# Patient Record
Sex: FEMALE | Race: OTHER | HISPANIC OR LATINO | URBAN - METROPOLITAN AREA
[De-identification: names, ages, dates, MRNs, and addresses within clinical notes are randomized per-mention and may not be internally consistent; named-entity substitution may affect disease eponyms.]

---

## 2024-02-05 ENCOUNTER — INPATIENT (INPATIENT)
Facility: HOSPITAL | Age: 39
LOS: 1 days | Discharge: HOME | DRG: 225 | End: 2024-02-07
Attending: SURGERY | Admitting: SURGERY
Payer: MEDICAID

## 2024-02-05 VITALS
HEART RATE: 84 BPM | TEMPERATURE: 98 F | RESPIRATION RATE: 18 BRPM | SYSTOLIC BLOOD PRESSURE: 119 MMHG | DIASTOLIC BLOOD PRESSURE: 72 MMHG | OXYGEN SATURATION: 100 %

## 2024-02-05 DIAGNOSIS — Z78.9 OTHER SPECIFIED HEALTH STATUS: ICD-10-CM

## 2024-02-05 LAB
ABO RH CONFIRMATION: SIGNIFICANT CHANGE UP
ALBUMIN SERPL ELPH-MCNC: 3.4 G/DL — SIGNIFICANT CHANGE UP (ref 3.3–5)
ALP SERPL-CCNC: 70 U/L — SIGNIFICANT CHANGE UP (ref 40–120)
ALT FLD-CCNC: 23 U/L — SIGNIFICANT CHANGE UP (ref 12–78)
ANION GAP SERPL CALC-SCNC: 3 MMOL/L — LOW (ref 5–17)
APPEARANCE UR: CLEAR — SIGNIFICANT CHANGE UP
APTT BLD: 29.4 SEC — SIGNIFICANT CHANGE UP (ref 24.5–35.6)
AST SERPL-CCNC: 10 U/L — LOW (ref 15–37)
BASOPHILS # BLD AUTO: 0.03 K/UL — SIGNIFICANT CHANGE UP (ref 0–0.2)
BASOPHILS NFR BLD AUTO: 0.4 % — SIGNIFICANT CHANGE UP (ref 0–2)
BILIRUB SERPL-MCNC: 0.3 MG/DL — SIGNIFICANT CHANGE UP (ref 0.2–1.2)
BILIRUB UR-MCNC: NEGATIVE — SIGNIFICANT CHANGE UP
BUN SERPL-MCNC: 10 MG/DL — SIGNIFICANT CHANGE UP (ref 7–23)
CALCIUM SERPL-MCNC: 8.6 MG/DL — SIGNIFICANT CHANGE UP (ref 8.5–10.1)
CHLORIDE SERPL-SCNC: 111 MMOL/L — HIGH (ref 96–108)
CO2 SERPL-SCNC: 26 MMOL/L — SIGNIFICANT CHANGE UP (ref 22–31)
COLOR SPEC: YELLOW — SIGNIFICANT CHANGE UP
CREAT SERPL-MCNC: 0.45 MG/DL — LOW (ref 0.5–1.3)
DIFF PNL FLD: NEGATIVE — SIGNIFICANT CHANGE UP
EGFR: 125 ML/MIN/1.73M2 — SIGNIFICANT CHANGE UP
EOSINOPHIL # BLD AUTO: 0.25 K/UL — SIGNIFICANT CHANGE UP (ref 0–0.5)
EOSINOPHIL NFR BLD AUTO: 3 % — SIGNIFICANT CHANGE UP (ref 0–6)
GLUCOSE SERPL-MCNC: 117 MG/DL — HIGH (ref 70–99)
GLUCOSE UR QL: NEGATIVE MG/DL — SIGNIFICANT CHANGE UP
HCT VFR BLD CALC: 35.5 % — SIGNIFICANT CHANGE UP (ref 34.5–45)
HGB BLD-MCNC: 11.3 G/DL — LOW (ref 11.5–15.5)
IMM GRANULOCYTES NFR BLD AUTO: 0.2 % — SIGNIFICANT CHANGE UP (ref 0–0.9)
INR BLD: 0.94 RATIO — SIGNIFICANT CHANGE UP (ref 0.85–1.18)
KETONES UR-MCNC: NEGATIVE MG/DL — SIGNIFICANT CHANGE UP
LEUKOCYTE ESTERASE UR-ACNC: NEGATIVE — SIGNIFICANT CHANGE UP
LYMPHOCYTES # BLD AUTO: 2.24 K/UL — SIGNIFICANT CHANGE UP (ref 1–3.3)
LYMPHOCYTES # BLD AUTO: 27.1 % — SIGNIFICANT CHANGE UP (ref 13–44)
MCHC RBC-ENTMCNC: 26 PG — LOW (ref 27–34)
MCHC RBC-ENTMCNC: 31.8 GM/DL — LOW (ref 32–36)
MCV RBC AUTO: 81.8 FL — SIGNIFICANT CHANGE UP (ref 80–100)
MONOCYTES # BLD AUTO: 0.64 K/UL — SIGNIFICANT CHANGE UP (ref 0–0.9)
MONOCYTES NFR BLD AUTO: 7.7 % — SIGNIFICANT CHANGE UP (ref 2–14)
NEUTROPHILS # BLD AUTO: 5.08 K/UL — SIGNIFICANT CHANGE UP (ref 1.8–7.4)
NEUTROPHILS NFR BLD AUTO: 61.6 % — SIGNIFICANT CHANGE UP (ref 43–77)
NITRITE UR-MCNC: NEGATIVE — SIGNIFICANT CHANGE UP
PH UR: 5.5 — SIGNIFICANT CHANGE UP (ref 5–8)
PLATELET # BLD AUTO: 281 K/UL — SIGNIFICANT CHANGE UP (ref 150–400)
POCT URINE PREGNANCY TEST: NEGATIVE — SIGNIFICANT CHANGE UP
POTASSIUM SERPL-MCNC: 3.5 MMOL/L — SIGNIFICANT CHANGE UP (ref 3.5–5.3)
POTASSIUM SERPL-SCNC: 3.5 MMOL/L — SIGNIFICANT CHANGE UP (ref 3.5–5.3)
PROT SERPL-MCNC: 7.3 GM/DL — SIGNIFICANT CHANGE UP (ref 6–8.3)
PROT UR-MCNC: NEGATIVE MG/DL — SIGNIFICANT CHANGE UP
PROTHROM AB SERPL-ACNC: 10.6 SEC — SIGNIFICANT CHANGE UP (ref 9.5–13)
RBC # BLD: 4.34 M/UL — SIGNIFICANT CHANGE UP (ref 3.8–5.2)
RBC # FLD: 14.8 % — HIGH (ref 10.3–14.5)
SODIUM SERPL-SCNC: 140 MMOL/L — SIGNIFICANT CHANGE UP (ref 135–145)
SP GR SPEC: 1.03 — SIGNIFICANT CHANGE UP (ref 1–1.03)
UROBILINOGEN FLD QL: 0.2 MG/DL — SIGNIFICANT CHANGE UP (ref 0.2–1)
WBC # BLD: 8.26 K/UL — SIGNIFICANT CHANGE UP (ref 3.8–10.5)
WBC # FLD AUTO: 8.26 K/UL — SIGNIFICANT CHANGE UP (ref 3.8–10.5)

## 2024-02-05 PROCEDURE — 85610 PROTHROMBIN TIME: CPT

## 2024-02-05 PROCEDURE — 93005 ELECTROCARDIOGRAM TRACING: CPT

## 2024-02-05 PROCEDURE — 74177 CT ABD & PELVIS W/CONTRAST: CPT | Mod: 26,MA

## 2024-02-05 PROCEDURE — 86900 BLOOD TYPING SEROLOGIC ABO: CPT

## 2024-02-05 PROCEDURE — 84100 ASSAY OF PHOSPHORUS: CPT

## 2024-02-05 PROCEDURE — 90471 IMMUNIZATION ADMIN: CPT

## 2024-02-05 PROCEDURE — 36415 COLL VENOUS BLD VENIPUNCTURE: CPT

## 2024-02-05 PROCEDURE — 81025 URINE PREGNANCY TEST: CPT

## 2024-02-05 PROCEDURE — C9290: CPT

## 2024-02-05 PROCEDURE — C1889: CPT

## 2024-02-05 PROCEDURE — 85730 THROMBOPLASTIN TIME PARTIAL: CPT

## 2024-02-05 PROCEDURE — 93010 ELECTROCARDIOGRAM REPORT: CPT

## 2024-02-05 PROCEDURE — 90686 IIV4 VACC NO PRSV 0.5 ML IM: CPT

## 2024-02-05 PROCEDURE — 86850 RBC ANTIBODY SCREEN: CPT

## 2024-02-05 PROCEDURE — 85027 COMPLETE CBC AUTOMATED: CPT

## 2024-02-05 PROCEDURE — 83735 ASSAY OF MAGNESIUM: CPT

## 2024-02-05 PROCEDURE — 76830 TRANSVAGINAL US NON-OB: CPT | Mod: 26

## 2024-02-05 PROCEDURE — 86901 BLOOD TYPING SEROLOGIC RH(D): CPT

## 2024-02-05 PROCEDURE — 88304 TISSUE EXAM BY PATHOLOGIST: CPT

## 2024-02-05 PROCEDURE — 80048 BASIC METABOLIC PNL TOTAL CA: CPT

## 2024-02-05 PROCEDURE — 99285 EMERGENCY DEPT VISIT HI MDM: CPT

## 2024-02-05 RX ORDER — ACETAMINOPHEN 500 MG
1000 TABLET ORAL ONCE
Refills: 0 | Status: DISCONTINUED | OUTPATIENT
Start: 2024-02-05 | End: 2024-02-07

## 2024-02-05 RX ORDER — INFLUENZA VIRUS VACCINE 15; 15; 15; 15 UG/.5ML; UG/.5ML; UG/.5ML; UG/.5ML
0.5 SUSPENSION INTRAMUSCULAR ONCE
Refills: 0 | Status: COMPLETED | OUTPATIENT
Start: 2024-02-05 | End: 2024-02-07

## 2024-02-05 RX ORDER — KETOROLAC TROMETHAMINE 30 MG/ML
15 SYRINGE (ML) INJECTION EVERY 8 HOURS
Refills: 0 | Status: DISCONTINUED | OUTPATIENT
Start: 2024-02-05 | End: 2024-02-07

## 2024-02-05 RX ORDER — CEFTRIAXONE 500 MG/1
1000 INJECTION, POWDER, FOR SOLUTION INTRAMUSCULAR; INTRAVENOUS EVERY 24 HOURS
Refills: 0 | Status: DISCONTINUED | OUTPATIENT
Start: 2024-02-05 | End: 2024-02-05

## 2024-02-05 RX ORDER — ACETAMINOPHEN 500 MG
1000 TABLET ORAL ONCE
Refills: 0 | Status: COMPLETED | OUTPATIENT
Start: 2024-02-05 | End: 2024-02-05

## 2024-02-05 RX ORDER — SODIUM CHLORIDE 9 MG/ML
1000 INJECTION INTRAMUSCULAR; INTRAVENOUS; SUBCUTANEOUS ONCE
Refills: 0 | Status: COMPLETED | OUTPATIENT
Start: 2024-02-05 | End: 2024-02-05

## 2024-02-05 RX ORDER — METRONIDAZOLE 500 MG
500 TABLET ORAL EVERY 8 HOURS
Refills: 0 | Status: DISCONTINUED | OUTPATIENT
Start: 2024-02-05 | End: 2024-02-06

## 2024-02-05 RX ORDER — CEFTRIAXONE 500 MG/1
1000 INJECTION, POWDER, FOR SOLUTION INTRAMUSCULAR; INTRAVENOUS EVERY 24 HOURS
Refills: 0 | Status: DISCONTINUED | OUTPATIENT
Start: 2024-02-05 | End: 2024-02-06

## 2024-02-05 RX ORDER — MORPHINE SULFATE 50 MG/1
4 CAPSULE, EXTENDED RELEASE ORAL ONCE
Refills: 0 | Status: DISCONTINUED | OUTPATIENT
Start: 2024-02-05 | End: 2024-02-05

## 2024-02-05 RX ORDER — SODIUM CHLORIDE 9 MG/ML
1000 INJECTION, SOLUTION INTRAVENOUS
Refills: 0 | Status: DISCONTINUED | OUTPATIENT
Start: 2024-02-05 | End: 2024-02-06

## 2024-02-05 RX ADMIN — Medication 400 MILLIGRAM(S): at 19:35

## 2024-02-05 RX ADMIN — MORPHINE SULFATE 4 MILLIGRAM(S): 50 CAPSULE, EXTENDED RELEASE ORAL at 15:45

## 2024-02-05 RX ADMIN — SODIUM CHLORIDE 1000 MILLILITER(S): 9 INJECTION INTRAMUSCULAR; INTRAVENOUS; SUBCUTANEOUS at 15:45

## 2024-02-05 RX ADMIN — Medication 100 MILLIGRAM(S): at 21:44

## 2024-02-05 RX ADMIN — CEFTRIAXONE 1000 MILLIGRAM(S): 500 INJECTION, POWDER, FOR SOLUTION INTRAMUSCULAR; INTRAVENOUS at 19:35

## 2024-02-05 RX ADMIN — SODIUM CHLORIDE 115 MILLILITER(S): 9 INJECTION, SOLUTION INTRAVENOUS at 21:15

## 2024-02-05 NOTE — ED STATDOCS - ATTENDING APP SHARED VISIT CONTRIBUTION OF CARE
I, Hanna Jacques MD, performed the initial face to face bedside interview with this patient regarding history of present illness, review of symptoms and relevant past medical, social and family history.  I completed an independent physical examination.  I was the initial provider who evaluated this patient. I have signed out the follow up of any pending tests (i.e. labs, radiological studies) to the ACP.  I have communicated the patient’s plan of care and disposition with the ACP.  The history, relevant review of systems, past medical and surgical history, medical decision making, and physical examination was documented by the scribe in my presence and I attest to the accuracy of the documentation.

## 2024-02-05 NOTE — ED STATDOCS - PROGRESS NOTE DETAILS
37 y/o Female presents to ED c/o lower abdominal pain with foul smelling urine.  Will F/u Labs , CT and pelvic US imaging.  Will reassess s/p pain meds.  Anisa Teixeira PA-C WBC 8 CT scan with evidence of possible early appendicitis there is a dilated fluid-filled appendix on reexamination patient does have right lower quadrant tenderness with rebound and guarding called surgical resident for Dr. Del Cid to discuss case came to consult on patient and will be admitting to Dr. Del Cid service for appendectomy patient still uncomfortable so added BRADLY Shirley of for further pain control made patient aware she is n.p.o..  Anisa Teixeira PA-C

## 2024-02-05 NOTE — H&P ADULT - ASSESSMENT
Plan:  - Admit under Dr. Vivas 's service  - WIll undergo lap appy today  - Pain control PRN  - Monitor vitals  - NPO   - Nausea control PRN  - IV abx: Rocephin, flagyl  - IVF: LR@   - replete electrolytes  - daily labs  - OOB/PT      Plan will be discussed with General & Bariatric surgery attending, Dr. Vivas

## 2024-02-05 NOTE — H&P ADULT - NSHPLABSRESULTS_GEN_ALL_CORE
Chief complaint:      PMHx:  No pertinent past medical history        PSHx:      FHx:      Vitals:  T(C): 36.7 (02-05 @ 13:33), Max: 36.7 (02-05 @ 13:33)  HR: 84 (02-05 @ 13:33) (84 - 84)  BP: 119/72 (02-05 @ 13:33) (119/72 - 119/72)  RR: 18 (02-05 @ 13:33) (18 - 18)  SpO2: 100% (02-05 @ 13:33) (100% - 100%)      I&Os    .    Labs:  02-05 @ 14:47                    11.3  CBC: 8.26>)-------(<281                     35.5                 140 | 111 | 10    CMP:  ----------------------< 117               3.5 | 26 | 0.45                      Ca:8.6  Phos:-  Mg:-               0.3|      |10        LFTs:  ------|70|-----             -|      |-        Cultures:        Current Inpatient Medications:

## 2024-02-05 NOTE — ED STATDOCS - OBJECTIVE STATEMENT
39 y/o female presents to the ED c/o lower abd pain x3 days. Pt has been taking Tylenol at home without relief. Pt reports foul smelling urine. LNMP: 1/20. NKDA. No other complaints at this time.

## 2024-02-05 NOTE — ED ADULT NURSE NOTE - OBJECTIVE STATEMENT
Pt comes to the ED complaining of lower abdominal pain, burning urination and foul smelling urine x 3 days.

## 2024-02-05 NOTE — ED ADULT TRIAGE NOTE - HEIGHT IN INCHES
Pre-operative Bariatric Nutrition Evaluation ()     Date: 2021   Paul Castrejon M.D. Name: Henry Osorio  :  1988  Age:  34  Gender: Female   Type of Surgery: []           Gastric Bypass   [x]           Sleeve Gastrectomy    ASSESSMENT:     Medications/Supplements:   Prior to Admission medications    Medication Sig Start Date End Date Taking? Authorizing Provider   ibuprofen (MOTRIN) 600 mg tablet Take 1 Tab by mouth every six (6) hours as needed for Pain. 2/3/21   Tyra Bailey NP   aspirin (ASPIRIN) 325 mg tablet Take 325 mg by mouth daily. Provider, Historical   INTRAUTERINE DEVICE, IUD, IU by IntraUTERine route. Provider, Historical   butalbital-acetaminophen-caffeine (FIORICET, ESGIC) -40 mg per tablet Take 1 Tab by mouth every four (4) hours as needed for Headache. 20   Brianna ELLSWORTH NP   cetirizine (ZYRTEC) 10 mg tablet Take 1 Tab by mouth daily. 20   Zahra Banks MD   albuterol (PROVENTIL HFA, VENTOLIN HFA, PROAIR HFA) 90 mcg/actuation inhaler Take 2 Puffs by inhalation every six (6) hours as needed for Wheezing. 20   Zahra Banks MD       Food Allergies/Intolerances:none    Anthropometrics:    Ht:64\"   Recent Office Wt: 304    IBW: 120#    %IBW: 253%     BMI:52    Category: obesity III     Reported wt history: Pt completing pre-op nutrition evaluation for wt loss surgery over the phone d/t social distancing guidelines d/t COVID-19. Reports lowest adult BW of 170# and highest adult BW of 304#. Attributes wt gain over the years r/t wt gain with pregnancies . Has attempted wt loss through various methods with most successful wt loss of 90#. Has been unable to maintain long term or significant wt loss and is now seeking approval for weight loss surgery. Pt will need to complete 6 months of supervised weight loss for insurance requirements.      Exercise/Physical Activity:stationarby bike 20-30 minutes, 2-3 times per week    Reported Diet History:self-directed diets, portion control, exercise     24 Hour Diet Recall  Breakfast  Coffee, turkey sandwich    Lunch  Skips or leftovers from dinner    Dinner  Chicken or fish sandwich,    Snacks  Candy bar and chips, mashed potatoes, mac and cheese    Beverages  Water, Juice, no soda,       Environment/Psychosocial/Support:Pt reports good support from mother. Pt works as an online /garcia. Pt has 2 children. Pt has two close friends who have had weight loss surgery. NUTRITION DIAGNOSIS:  1. Excessive energy intake r/t food and beverage choices evidenced by diet recall that reveals high caloric density foods and beverages. 2. Food and nutrition related knowledge deficit r/t lack of prior exposure to information evidenced by pt seeking nutrition education for sleeve gastrectomy. NUTRITION INTERVENTION:  Pt educated on nutrition recommendations for weight loss surgery, specifically sleeve gastrectomy. Instructed on consuming 3 meals per day starting now. Use the balanced plate method to plan meals, include 3 oz of lean source of protein, 1/2 cup whole grains, unlimited non-starchy vegetables, 1/2 cup fruit and 1 serving of low fat dairy. Utilize handouts listing healthy snack and meal ideas to limit restaurant meals. After surgery measure all meals to 1/2 cup. Each meal will contain a 1/4 cup lean protein and 1/4 cup fruit, non-starchy vegetable or starch (limiting to once per day). Aim for 60 g protein per day. Sip on 48-64 oz of sugar free, calorie free, non-carbonated beverages each day. Do not use a straw. Do not consume beverages 30 minutes before, during or 30 minutes after meals. Read all nutrition labels. Demonstrated and emphasized identifying serving size, total fat, sugar and protein content. Defined low fat as </= 3 g per serving. Discussed lean and extra lean sources of protein. Provided list of low fat cooking methods.  Avoid foods with sugar listed in the first 3 ingredients and >/15 g sugar per serving. Excess sugar/fat intake may lead to dumping syndrome. Discussed signs and symptoms of dumping syndrome. Practice mindful eating habits; take small bites, chew thoroughly, avoid distractions, utilize hunger/fullness scale. Consume meals over 20-30 minutes. Attend Bariatric Support Group and increase physical activity (approved per MD) for long term weight maintenance. NUTRITION MONITORING AND EVALUATION:    The following goals were established with patient;  1. Improve food and beverage choices. Focus on more lean protein, fruits, vegetables, whole grains and low-fat options. 2. Limit juice intake to only 4 oz per day. Drink more water and other calorie-free and sugar-free fluids. 3. Maintain current exercise and aim for 150 minutes per week as tolerated. 4. Review nutrition education materials provided. Follow up next month for continued nutrition education and supervised weight loss. Specific tips and techniques to facilitate compliance with above recommendations were provided and discussed. Nutrition evaluation reveals pt is actively making small changes with continued changes indicated. Goals set and recommendations made. Will continue to assess. If further details are desired please feel free to contact me at [phone]. This phone number was also provided to the patient for any further questions or concerns.            Waqas Reynolds RD 10

## 2024-02-05 NOTE — ED ADULT TRIAGE NOTE - CHIEF COMPLAINT QUOTE
pt c/o lower abdominal pain, nausea, vomiting x 3 days . lmp 01/20/2023. pain 10/10. denies cp/sob/n/v/d/fever/chills. no signigicant pmh.

## 2024-02-05 NOTE — H&P ADULT - NSHPPHYSICALEXAM_GEN_ALL_CORE
ROS: Negative except written above    PHYSICAL EXAM:  - GENERAL: No acute distress.  - EYES: EOMI. Anicteric.  - HENT: Moist mucous membranes. No scleral icterus. No cervical lymphadenopathy.  - LUNGS:  No accessory muscle use.  - CARDIOVASCULAR: Regular rate and rhythm.   - ABDOMEN: obese, Soft, mildly tender to palpation at RLQ and non-distended. No palpable masses.  - EXTREMITIES: No edema. Non-tender.  - SKIN: No rashes or lesions. Warm.  - NEUROLOGIC: No focal neurological deficits. CN II-XII grossly intact, but not individually tested.  - PSYCHIATRIC: Cooperative. Appropriate mood and affect.

## 2024-02-05 NOTE — H&P ADULT - NSHPPOAPULMEMBOLUS_GEN_A_CORE
Received the following message from the [facility] team:    Yamilet Gallegos,   Patient has straight Medicare/Medicaid and prior authorization is not required for either plan. Glad the system auto authorized in this instance! Patient is good to go. Thanks for confirming. Order faxed to [facility] and patient notified. no

## 2024-02-05 NOTE — H&P ADULT - HISTORY OF PRESENT ILLNESS
38F w/ no PMHx nor PSHx presented with 3 days of lower abdominal pain that has not been subsided with tylenol. Denies fever, admits chills. + Nausea, no Vomiting. Denies similar pain in the past. + diarrhea yesterday. Denies other complaints suchs as CP, SOB at this time    PMHx: Denies  PSHx: Denies  ALL: NKDA    Yi speaking

## 2024-02-05 NOTE — PHARMACOTHERAPY INTERVENTION NOTE - COMMENTS
Medication reconciliation completed.  Reviewed Medication list and confirmed med allergies with patient; confirmed with Dr. Longoria MedHx.  pt confirms that she does not take any medication at home.

## 2024-02-05 NOTE — ED STATDOCS - PHYSICAL EXAMINATION
Constitutional: NAD   Eyes: PERRLA  Head: Normocephalic   Mouth: MMM  Cardiac: regular rate   Resp: Lungs CTAB  GI: Abd s/nd, no rebound or guarding. b/l lower quadrant tenderness. No CVAT  Neuro: awake, alert, moving all extremities  Skin: No rashes

## 2024-02-06 LAB
ANION GAP SERPL CALC-SCNC: 5 MMOL/L — SIGNIFICANT CHANGE UP (ref 5–17)
BUN SERPL-MCNC: 8 MG/DL — SIGNIFICANT CHANGE UP (ref 7–23)
CALCIUM SERPL-MCNC: 8 MG/DL — LOW (ref 8.5–10.1)
CHLORIDE SERPL-SCNC: 113 MMOL/L — HIGH (ref 96–108)
CO2 SERPL-SCNC: 23 MMOL/L — SIGNIFICANT CHANGE UP (ref 22–31)
CREAT SERPL-MCNC: 0.47 MG/DL — LOW (ref 0.5–1.3)
EGFR: 124 ML/MIN/1.73M2 — SIGNIFICANT CHANGE UP
GLUCOSE SERPL-MCNC: 91 MG/DL — SIGNIFICANT CHANGE UP (ref 70–99)
HCG UR QL: NEGATIVE — SIGNIFICANT CHANGE UP
HCT VFR BLD CALC: 31.7 % — LOW (ref 34.5–45)
HGB BLD-MCNC: 10.1 G/DL — LOW (ref 11.5–15.5)
MAGNESIUM SERPL-MCNC: 2.1 MG/DL — SIGNIFICANT CHANGE UP (ref 1.6–2.6)
MCHC RBC-ENTMCNC: 25.8 PG — LOW (ref 27–34)
MCHC RBC-ENTMCNC: 31.9 GM/DL — LOW (ref 32–36)
MCV RBC AUTO: 80.9 FL — SIGNIFICANT CHANGE UP (ref 80–100)
PHOSPHATE SERPL-MCNC: 3.1 MG/DL — SIGNIFICANT CHANGE UP (ref 2.5–4.5)
PLATELET # BLD AUTO: 243 K/UL — SIGNIFICANT CHANGE UP (ref 150–400)
POTASSIUM SERPL-MCNC: 3.4 MMOL/L — LOW (ref 3.5–5.3)
POTASSIUM SERPL-SCNC: 3.4 MMOL/L — LOW (ref 3.5–5.3)
RBC # BLD: 3.92 M/UL — SIGNIFICANT CHANGE UP (ref 3.8–5.2)
RBC # FLD: 14.9 % — HIGH (ref 10.3–14.5)
SODIUM SERPL-SCNC: 141 MMOL/L — SIGNIFICANT CHANGE UP (ref 135–145)
WBC # BLD: 5.69 K/UL — SIGNIFICANT CHANGE UP (ref 3.8–10.5)
WBC # FLD AUTO: 5.69 K/UL — SIGNIFICANT CHANGE UP (ref 3.8–10.5)

## 2024-02-06 PROCEDURE — 88304 TISSUE EXAM BY PATHOLOGIST: CPT | Mod: 26

## 2024-02-06 RX ORDER — HYDROMORPHONE HYDROCHLORIDE 2 MG/ML
1 INJECTION INTRAMUSCULAR; INTRAVENOUS; SUBCUTANEOUS
Refills: 0 | Status: DISCONTINUED | OUTPATIENT
Start: 2024-02-06 | End: 2024-02-06

## 2024-02-06 RX ORDER — POTASSIUM CHLORIDE 20 MEQ
10 PACKET (EA) ORAL
Refills: 0 | Status: COMPLETED | OUTPATIENT
Start: 2024-02-06 | End: 2024-02-06

## 2024-02-06 RX ORDER — OXYCODONE AND ACETAMINOPHEN 5; 325 MG/1; MG/1
1 TABLET ORAL
Qty: 20 | Refills: 0
Start: 2024-02-06 | End: 2024-02-10

## 2024-02-06 RX ORDER — ONDANSETRON 8 MG/1
4 TABLET, FILM COATED ORAL ONCE
Refills: 0 | Status: DISCONTINUED | OUTPATIENT
Start: 2024-02-06 | End: 2024-02-06

## 2024-02-06 RX ORDER — SODIUM CHLORIDE 9 MG/ML
1000 INJECTION, SOLUTION INTRAVENOUS
Refills: 0 | Status: DISCONTINUED | OUTPATIENT
Start: 2024-02-06 | End: 2024-02-06

## 2024-02-06 RX ADMIN — SODIUM CHLORIDE 115 MILLILITER(S): 9 INJECTION, SOLUTION INTRAVENOUS at 05:05

## 2024-02-06 RX ADMIN — Medication 15 MILLIGRAM(S): at 20:02

## 2024-02-06 RX ADMIN — SODIUM CHLORIDE 115 MILLILITER(S): 9 INJECTION, SOLUTION INTRAVENOUS at 17:50

## 2024-02-06 RX ADMIN — Medication 100 MILLIGRAM(S): at 05:23

## 2024-02-06 RX ADMIN — Medication 100 MILLIEQUIVALENT(S): at 10:16

## 2024-02-06 RX ADMIN — Medication 100 MILLIEQUIVALENT(S): at 09:11

## 2024-02-06 RX ADMIN — Medication 15 MILLIGRAM(S): at 20:30

## 2024-02-06 RX ADMIN — Medication 100 MILLIEQUIVALENT(S): at 08:15

## 2024-02-06 NOTE — DISCHARGE NOTE PROVIDER - HOSPITAL COURSE
38F w/ no PMHx nor PSHx presented with 3 days of lower abdominal pain that has not been subsided with tylenol. Imaging suspicious for acute appendicitis. Patient taken to OR for laparoscopic appendectomy. Patient tolerated the procedure well and diet was advanced as tolerated. Patient discharged once tolerating diet with normal bowel function and pain well controlled.

## 2024-02-06 NOTE — DISCHARGE NOTE PROVIDER - NSDCMRMEDTOKEN_GEN_ALL_CORE_FT
Percocet 5 mg-325 mg oral tablet: 1 tab(s) orally every 6 hours as needed for  moderate pain MDD: 4gm

## 2024-02-06 NOTE — DIETITIAN INITIAL EVALUATION ADULT - ADD RECOMMEND
1. Recommend ADAT to Regular diet as medically feasible  2. Encourage protein-rich foods, maximize food preferences   3. Monitor bowel movements, if no BM for >3 days, consider implementing bowel regimen.   4. MVI w/ minerals daily to ensure 100% RDA met   5. Obtain vitamin D 25OH level to assess nutriture   6. Please obtain weekly weights   7. Confirm goals of care regarding nutrition support   RD will continue to monitor PO intake, labs, hydration, and wt prn.

## 2024-02-06 NOTE — DISCHARGE NOTE PROVIDER - NSDCCPTREATMENT_GEN_ALL_CORE_FT
PRINCIPAL PROCEDURE  Procedure: Laparoscopic appendectomy  Findings and Treatment:       SECONDARY PROCEDURE  Procedure: Block, transversus abdominis plane, bilateral  Findings and Treatment:

## 2024-02-06 NOTE — DIETITIAN INITIAL EVALUATION ADULT - NS FNS DIET ORDER
Diet, NPO (02-05-24 @ 17:59)  Diet, NPO:   NPO for Procedure/Test     NPO Start Date: 05-Feb-2024,   NPO Start Time: 17:00 (02-05-24 @ 17:52)

## 2024-02-06 NOTE — PROGRESS NOTE ADULT - ATTENDING COMMENTS
Patient resting in bed, afebrile, stable, with improvement of her abdominal pain. Discussed in laymen's terms appendicitis & risk & benefit's of surgery.

## 2024-02-06 NOTE — DIETITIAN INITIAL EVALUATION ADULT - PERTINENT MEDS FT
MEDICATIONS  (STANDING):  cefTRIAXone Injectable. 1000 milliGRAM(s) IV Push every 24 hours  influenza   Vaccine 0.5 milliLiter(s) IntraMuscular once  lactated ringers. 1000 milliLiter(s) (115 mL/Hr) IV Continuous <Continuous>  lactated ringers. 1000 milliLiter(s) (75 mL/Hr) IV Continuous <Continuous>  metroNIDAZOLE  IVPB 500 milliGRAM(s) IV Intermittent every 8 hours    MEDICATIONS  (PRN):  acetaminophen   IVPB .. 1000 milliGRAM(s) IV Intermittent once PRN Temp greater or equal to 38C (100.4F), Mild Pain (1 - 3)  HYDROmorphone  Injectable 1 milliGRAM(s) IV Push every 10 minutes PRN Severe Pain (7 - 10)  ketorolac   Injectable 15 milliGRAM(s) IV Push every 8 hours PRN Severe Pain (7 - 10)  ondansetron Injectable 4 milliGRAM(s) IV Push once PRN Nausea and/or Vomiting

## 2024-02-06 NOTE — PRE-OP CHECKLIST - SELECT TESTS ORDERED
BMP/CBC/CMP/PT/PTT/INR/Type and Screen/Urinalysis/EKG BMP/CBC/CMP/PT/PTT/INR/Type and Screen/Urinalysis/UCG/EKG

## 2024-02-06 NOTE — DIETITIAN INITIAL EVALUATION ADULT - ORAL INTAKE PTA/DIET HISTORY
Pt is Greek speaking. Unable to reach Language Line , spoke to cousin at bed side. Reports pt usually has good appetite, consumes 3 meals/day however has had 2 days poor appeite 2/2 nausea, pain. Pt lives w/ cousin who grocery shops and cooks for pt.

## 2024-02-06 NOTE — DIETITIAN INITIAL EVALUATION ADULT - OTHER INFO
38F w/ no PMHx nor PSHx presented with 3 days of lower abdominal pain that has not been subsided with tylenol. + Nausea, no Vomiting. Denies similar pain in the past. + diarrhea yesterday. Adm w/ appendicitis. Plan for OR today for appendectomy.     Pt reports appetite is improved and she is hungry at time of RD visit, however pt currently NPO for appy 2/6. Endorses UBW of 180# from unknown time frame. Bed scale wt of 179.4# obtained by RD on 2/6. NFPE reveals no muscle/fat wasting at this time - pt appears slightly overwt. Recommend ADAT to Regular diet as medically feasible. See below for further recommendations.

## 2024-02-06 NOTE — DIETITIAN INITIAL EVALUATION ADULT - PERTINENT LABORATORY DATA
02-06    141  |  113<H>  |  8   ----------------------------<  91  3.4<L>   |  23  |  0.47<L>    Ca    8.0<L>      06 Feb 2024 05:12  Phos  3.1     02-06  Mg     2.1     02-06    TPro  7.3  /  Alb  3.4  /  TBili  0.3  /  DBili  x   /  AST  10<L>  /  ALT  23  /  AlkPhos  70  02-05

## 2024-02-06 NOTE — BRIEF OPERATIVE NOTE - NSICDXBRIEFPROCEDURE_GEN_ALL_CORE_FT
PROCEDURES:  Laparoscopic appendectomy 06-Feb-2024 14:13:34  Rafael Walter, transversus abdominis plane, bilateral 06-Feb-2024 14:14:08  Rafael Walter

## 2024-02-06 NOTE — DISCHARGE NOTE PROVIDER - CARE PROVIDER_API CALL
Juan José Vivas  Surgery  [address] [place] [address], [address]  [place] [address]  Phone: [phone]  Fax: [phone]  Follow Up Time: 2 weeks

## 2024-02-06 NOTE — DIETITIAN INITIAL EVALUATION ADULT - NSFNSGIIOFT_GEN_A_CORE
I&O's Detail    05 Feb 2024 07:01  -  06 Feb 2024 07:00  --------------------------------------------------------  IN:    IV PiggyBack: 100 mL    Lactated Ringers: 1265 mL  Total IN: 1365 mL    OUT:  Total OUT: 0 mL    Total NET: 1365 mL      06 Feb 2024 07:01  -  06 Feb 2024 12:46  --------------------------------------------------------  IN:    IV PiggyBack: 300 mL    Lactated Ringers: 575 mL  Total IN: 875 mL    OUT:  Total OUT: 0 mL    Total NET: 875 mL

## 2024-02-07 VITALS
RESPIRATION RATE: 16 BRPM | OXYGEN SATURATION: 100 % | SYSTOLIC BLOOD PRESSURE: 106 MMHG | DIASTOLIC BLOOD PRESSURE: 65 MMHG | HEART RATE: 89 BPM | TEMPERATURE: 99 F

## 2024-02-07 RX ADMIN — Medication 15 MILLIGRAM(S): at 04:20

## 2024-02-07 RX ADMIN — Medication 15 MILLIGRAM(S): at 05:00

## 2024-02-07 RX ADMIN — INFLUENZA VIRUS VACCINE 0.5 MILLILITER(S): 15; 15; 15; 15 SUSPENSION INTRAMUSCULAR at 09:50

## 2024-02-07 NOTE — PROGRESS NOTE ADULT - ASSESSMENT
38F w/ no PMHx nor PSHx, admitted for acute appendicitis POD1 Lap appy      Plan:  - DC today        
38F w/ no PMHx nor PSHx, admitted for acute appendicitis      Plan:  - OR: lap appy today  - Pain control PRN  - NPO , m fluids   - Nausea control PRN  - continue w ABx till OR  - OOB

## 2024-02-07 NOTE — DISCHARGE NOTE NURSING/CASE MANAGEMENT/SOCIAL WORK - PATIENT PORTAL LINK FT
You can access the FollowMyHealth Patient Portal offered by [facility] by registering at the following website: [email or web address]. By joining [facility]’s FollowMyHealth portal, you will also be able to view your health information using other applications (apps) compatible with our system.

## 2024-02-07 NOTE — PROGRESS NOTE ADULT - SUBJECTIVE AND OBJECTIVE BOX
doing well this am, POD1 appendectomy, pain controlled , ready to be discharged, wanted to stay overnight because of ride issue     PHYSICAL EXAM:  - GENERAL: No acute distress.  - EYES: EOMI. Anicteric.  - HENT: Moist mucous membranes. No scleral icterus. No cervical lymphadenopathy.  - LUNGS:  No accessory muscle use.  - CARDIOVASCULAR: Regular rate and rhythm.   - ABDOMEN: obese, Soft, appropriately tender, incisions c/d/i   - EXTREMITIES: No edema. Non-tender.          Vitals:  T(C): 36.8 ( @ 04:08), Max: 37.5 ( @ 08:19)  HR: 76 ( @ :08) (66 - 86)  BP: 101/66 ( @ 04:08) (101/51 - 115/65)  RR: 16 ( @ 04:08) (14 - 19)  SpO2: 100% ( 04:08) (97% - 100%)      I&Os     @ 07:01  -   @ 07:00  --------------------------------------------------------  IN:    IV PiggyBack: 100 mL    Lactated Ringers: 1265 mL  Total IN: 1365 mL    OUT:  Total OUT: 0 mL    Total NET: 1365 mL       @ 07:01  -   @ 05:54  --------------------------------------------------------  IN:    IV PiggyBack: 300 mL    Lactated Ringers: 1322.5 mL    Other (mL): 500 mL  Total IN: 2122.5 mL    OUT:    Voided (mL): 200 mL  Total OUT: 200 mL    Total NET: 1922.5 mL        .    Labs:   @ 05:12                    10.1  CBC: 5.69>)-------(<243                     31.7                 141 | 113 | 8    CMP:  ----------------------< 91               3.4 | 23 | 0.47                      Ca:8.0  Phos:3.1  M.1               -|      |-        LFTs:  ------|-|-----             -|      |-        Cultures:    Culture - Urine (collected 24 @ 14:47)  Source: Clean Catch Clean Catch (Midstream)  Preliminary Report (24 @ 23:02):    >100,000 CFU/ml Klebsiella pneumoniae          Current Inpatient Medications:  acetaminophen   IVPB .. 1000 milliGRAM(s) IV Intermittent once PRN  influenza   Vaccine 0.5 milliLiter(s) IntraMuscular once  ketorolac   Injectable 15 milliGRAM(s) IV Push every 8 hours PRN      
doing well this am, admitted for appendicitis, planning OR today, paim improved, NPO, m fluids, on IV ABx, no fever, no N/V    PHYSICAL EXAM:  - GENERAL: No acute distress.  - EYES: EOMI. Anicteric.  - HENT: Moist mucous membranes. No scleral icterus. No cervical lymphadenopathy.  - LUNGS:  No accessory muscle use.  - CARDIOVASCULAR: Regular rate and rhythm.   - ABDOMEN: obese, Soft, mildly tender to palpation at RLQ and non-distended. No palpable masses.  - EXTREMITIES: No edema. Non-tender.        Vitals:  T(C): 36.7 ( @ 04:04), Max: 36.8 ( @ 20:15)  HR: 72 ( @ 04:04) (63 - 85)  BP: 109/58 ( @ 04:04) (100/58 - 119/72)  RR: 18 ( @ 04:04) (17 - 18)  SpO2: 96% ( @ 04:04) (96% - 100%)      I&Os     @ 07:01  -   @ 06:42  --------------------------------------------------------  IN:    IV PiggyBack: 100 mL    Lactated Ringers: 1265 mL  Total IN: 1365 mL    OUT:  Total OUT: 0 mL    Total NET: 1365 mL        .    Labs:   @ 05:12                    10.1  CBC: 5.69>)-------(<243                     31.7                 141 | 113 | 8    CMP:  ----------------------< 91               3.4 | 23 | 0.47                      Ca:8.0  Phos:3.1  M.1               -|      |-        LFTs:  ------|-|-----             -|      |-   @ 14:47                    11.3  CBC: 8.26>)-------(<281                     35.5                 140 | 111 | 10    CMP:  ----------------------< 117               3.5 | 26 | 0.45                      Ca:8.6  Phos:-  Mg:-               0.3|      |10        LFTs:  ------|70|-----             -|      |-        Cultures:        Current Inpatient Medications:  acetaminophen   IVPB .. 1000 milliGRAM(s) IV Intermittent once PRN  cefTRIAXone Injectable. 1000 milliGRAM(s) IV Push every 24 hours  influenza   Vaccine 0.5 milliLiter(s) IntraMuscular once  ketorolac   Injectable 15 milliGRAM(s) IV Push every 8 hours PRN  lactated ringers. 1000 milliLiter(s) (115 mL/Hr) IV Continuous <Continuous>  metroNIDAZOLE  IVPB 500 milliGRAM(s) IV Intermittent every 8 hours  potassium chloride  10 mEq/100 mL IVPB 10 milliEquivalent(s) IV Intermittent every 1 hour

## 2024-02-07 NOTE — DISCHARGE NOTE NURSING/CASE MANAGEMENT/SOCIAL WORK - NSDCVIVACCINE_GEN_ALL_CORE_FT
influenza, injectable, quadrivalent, preservative free; 07-Feb-2024 09:50; Anastasiia Wooten (RN); [employer]; [id number] (Exp. Date: 01-Jun-2024); IntraMuscular; Deltoid Left.; 0.5 milliLiter(s); VIS (VIS Published: 06-Aug-2021, VIS Presented: 07-Feb-2024);

## 2024-02-13 DIAGNOSIS — K35.80 UNSPECIFIED ACUTE APPENDICITIS: ICD-10-CM

## 2024-02-14 LAB — SURGICAL PATHOLOGY STUDY: SIGNIFICANT CHANGE UP

## 2024-07-23 NOTE — PATIENT PROFILE ADULT - DOES PATIENT HAVE ADVANCE DIRECTIVE
No protocol for requested medication.      Last office visit date: 06/19/2024  Pharmacy: [facility] #[id number] [address]    Order pended, routed to clinician for review.      No

## 2025-01-08 ENCOUNTER — EMERGENCY (EMERGENCY)
Facility: HOSPITAL | Age: 40
LOS: 0 days | Discharge: HOME | End: 2025-01-08
Attending: STUDENT IN AN ORGANIZED HEALTH CARE EDUCATION/TRAINING PROGRAM
Payer: MEDICAID

## 2025-01-08 VITALS
HEART RATE: 86 BPM | SYSTOLIC BLOOD PRESSURE: 102 MMHG | OXYGEN SATURATION: 100 % | DIASTOLIC BLOOD PRESSURE: 61 MMHG | RESPIRATION RATE: 17 BRPM | TEMPERATURE: 98 F

## 2025-01-08 VITALS — WEIGHT: 165.35 LBS | HEIGHT: 60 IN

## 2025-01-08 DIAGNOSIS — J10.00 INFLUENZA DUE TO OTHER IDENTIFIED INFLUENZA VIRUS WITH UNSPECIFIED TYPE OF PNEUMONIA: ICD-10-CM

## 2025-01-08 DIAGNOSIS — R05.9 COUGH, UNSPECIFIED: ICD-10-CM

## 2025-01-08 PROBLEM — Z78.9 OTHER SPECIFIED HEALTH STATUS: Chronic | Status: ACTIVE | Noted: 2024-02-05

## 2025-01-08 LAB
ALBUMIN SERPL ELPH-MCNC: 3.2 G/DL — LOW (ref 3.3–5)
ALP SERPL-CCNC: 66 U/L — SIGNIFICANT CHANGE UP (ref 40–120)
ALT FLD-CCNC: 26 U/L — SIGNIFICANT CHANGE UP (ref 12–78)
ANION GAP SERPL CALC-SCNC: 4 MMOL/L — LOW (ref 5–17)
ANISOCYTOSIS BLD QL: SLIGHT — SIGNIFICANT CHANGE UP
AST SERPL-CCNC: 18 U/L — SIGNIFICANT CHANGE UP (ref 15–37)
BASOPHILS # BLD AUTO: 0.09 K/UL — SIGNIFICANT CHANGE UP (ref 0–0.2)
BASOPHILS NFR BLD AUTO: 0.6 % — SIGNIFICANT CHANGE UP (ref 0–2)
BILIRUB SERPL-MCNC: 0.4 MG/DL — SIGNIFICANT CHANGE UP (ref 0.2–1.2)
BUN SERPL-MCNC: 11 MG/DL — SIGNIFICANT CHANGE UP (ref 7–23)
CALCIUM SERPL-MCNC: 9.4 MG/DL — SIGNIFICANT CHANGE UP (ref 8.5–10.1)
CHLORIDE SERPL-SCNC: 107 MMOL/L — SIGNIFICANT CHANGE UP (ref 96–108)
CO2 SERPL-SCNC: 26 MMOL/L — SIGNIFICANT CHANGE UP (ref 22–31)
CREAT SERPL-MCNC: 0.6 MG/DL — SIGNIFICANT CHANGE UP (ref 0.5–1.3)
D DIMER BLD IA.RAPID-MCNC: 340 NG/ML DDU — HIGH
EGFR: 116 ML/MIN/1.73M2 — SIGNIFICANT CHANGE UP
ELLIPTOCYTES BLD QL SMEAR: SLIGHT — SIGNIFICANT CHANGE UP
EOSINOPHIL # BLD AUTO: 0.13 K/UL — SIGNIFICANT CHANGE UP (ref 0–0.5)
EOSINOPHIL NFR BLD AUTO: 0.8 % — SIGNIFICANT CHANGE UP (ref 0–6)
FLUAV AG NPH QL: DETECTED
FLUBV AG NPH QL: SIGNIFICANT CHANGE UP
GLUCOSE SERPL-MCNC: 119 MG/DL — HIGH (ref 70–99)
HCT VFR BLD CALC: 32.2 % — LOW (ref 34.5–45)
HGB BLD-MCNC: 9.5 G/DL — LOW (ref 11.5–15.5)
IMM GRANULOCYTES NFR BLD AUTO: 0.4 % — SIGNIFICANT CHANGE UP (ref 0–0.9)
LG PLATELETS BLD QL AUTO: SLIGHT — SIGNIFICANT CHANGE UP
LIDOCAIN IGE QN: 26 U/L — SIGNIFICANT CHANGE UP (ref 13–75)
LYMPHOCYTES # BLD AUTO: 15 % — SIGNIFICANT CHANGE UP (ref 13–44)
LYMPHOCYTES # BLD AUTO: 2.36 K/UL — SIGNIFICANT CHANGE UP (ref 1–3.3)
MAGNESIUM SERPL-MCNC: 2.3 MG/DL — SIGNIFICANT CHANGE UP (ref 1.6–2.6)
MANUAL SMEAR VERIFICATION: SIGNIFICANT CHANGE UP
MCHC RBC-ENTMCNC: 20.5 PG — LOW (ref 27–34)
MCHC RBC-ENTMCNC: 29.5 G/DL — LOW (ref 32–36)
MCV RBC AUTO: 69.5 FL — LOW (ref 80–100)
MICROCYTES BLD QL: SIGNIFICANT CHANGE UP
MONOCYTES # BLD AUTO: 1.34 K/UL — HIGH (ref 0–0.9)
MONOCYTES NFR BLD AUTO: 8.5 % — SIGNIFICANT CHANGE UP (ref 2–14)
NEUTROPHILS # BLD AUTO: 11.7 K/UL — HIGH (ref 1.8–7.4)
NEUTROPHILS NFR BLD AUTO: 74.7 % — SIGNIFICANT CHANGE UP (ref 43–77)
PLAT MORPH BLD: ABNORMAL
PLATELET # BLD AUTO: 528 K/UL — HIGH (ref 150–400)
POIKILOCYTOSIS BLD QL AUTO: SLIGHT — SIGNIFICANT CHANGE UP
POTASSIUM SERPL-MCNC: 3.8 MMOL/L — SIGNIFICANT CHANGE UP (ref 3.5–5.3)
POTASSIUM SERPL-SCNC: 3.8 MMOL/L — SIGNIFICANT CHANGE UP (ref 3.5–5.3)
PROT SERPL-MCNC: 8.3 GM/DL — SIGNIFICANT CHANGE UP (ref 6–8.3)
RBC # BLD: 4.63 M/UL — SIGNIFICANT CHANGE UP (ref 3.8–5.2)
RBC # FLD: 17.2 % — HIGH (ref 10.3–14.5)
RBC BLD AUTO: ABNORMAL
RSV RNA NPH QL NAA+NON-PROBE: SIGNIFICANT CHANGE UP
SARS-COV-2 RNA SPEC QL NAA+PROBE: SIGNIFICANT CHANGE UP
SODIUM SERPL-SCNC: 137 MMOL/L — SIGNIFICANT CHANGE UP (ref 135–145)
TROPONIN I, HIGH SENSITIVITY RESULT: <3 NG/L — SIGNIFICANT CHANGE UP
WBC # BLD: 15.69 K/UL — HIGH (ref 3.8–10.5)
WBC # FLD AUTO: 15.69 K/UL — HIGH (ref 3.8–10.5)

## 2025-01-08 PROCEDURE — 83690 ASSAY OF LIPASE: CPT

## 2025-01-08 PROCEDURE — 71275 CT ANGIOGRAPHY CHEST: CPT | Mod: MC

## 2025-01-08 PROCEDURE — 93005 ELECTROCARDIOGRAM TRACING: CPT

## 2025-01-08 PROCEDURE — 71045 X-RAY EXAM CHEST 1 VIEW: CPT

## 2025-01-08 PROCEDURE — 0241U: CPT

## 2025-01-08 PROCEDURE — 85025 COMPLETE CBC W/AUTO DIFF WBC: CPT

## 2025-01-08 PROCEDURE — 80053 COMPREHEN METABOLIC PANEL: CPT

## 2025-01-08 PROCEDURE — 96375 TX/PRO/DX INJ NEW DRUG ADDON: CPT | Mod: XU

## 2025-01-08 PROCEDURE — 99285 EMERGENCY DEPT VISIT HI MDM: CPT

## 2025-01-08 PROCEDURE — 71275 CT ANGIOGRAPHY CHEST: CPT | Mod: 26

## 2025-01-08 PROCEDURE — 93010 ELECTROCARDIOGRAM REPORT: CPT

## 2025-01-08 PROCEDURE — 71045 X-RAY EXAM CHEST 1 VIEW: CPT | Mod: 26

## 2025-01-08 PROCEDURE — 36415 COLL VENOUS BLD VENIPUNCTURE: CPT

## 2025-01-08 PROCEDURE — 96374 THER/PROPH/DIAG INJ IV PUSH: CPT | Mod: XU

## 2025-01-08 PROCEDURE — 99285 EMERGENCY DEPT VISIT HI MDM: CPT | Mod: 25

## 2025-01-08 PROCEDURE — 83735 ASSAY OF MAGNESIUM: CPT

## 2025-01-08 PROCEDURE — 85379 FIBRIN DEGRADATION QUANT: CPT

## 2025-01-08 PROCEDURE — 84484 ASSAY OF TROPONIN QUANT: CPT

## 2025-01-08 RX ORDER — AMOXICILLIN/POTASSIUM CLAV 875-125 MG
875 TABLET ORAL
Qty: 14 | Refills: 0
Start: 2025-01-08 | End: 2025-01-14

## 2025-01-08 RX ORDER — FAMOTIDINE 20 MG/1
20 TABLET, FILM COATED ORAL ONCE
Refills: 0 | Status: COMPLETED | OUTPATIENT
Start: 2025-01-08 | End: 2025-01-08

## 2025-01-08 RX ORDER — CEFTRIAXONE SODIUM 1 G/1
1000 INJECTION, POWDER, FOR SOLUTION INTRAMUSCULAR; INTRAVENOUS ONCE
Refills: 0 | Status: COMPLETED | OUTPATIENT
Start: 2025-01-08 | End: 2025-01-08

## 2025-01-08 RX ORDER — ACETAMINOPHEN 80 MG/.8ML
1000 SOLUTION/ DROPS ORAL ONCE
Refills: 0 | Status: COMPLETED | OUTPATIENT
Start: 2025-01-08 | End: 2025-01-08

## 2025-01-08 RX ORDER — CEFTRIAXONE SODIUM 1 G/1
1000 INJECTION, POWDER, FOR SOLUTION INTRAMUSCULAR; INTRAVENOUS ONCE
Refills: 0 | Status: DISCONTINUED | OUTPATIENT
Start: 2025-01-08 | End: 2025-01-08

## 2025-01-08 RX ORDER — ONDANSETRON 4 MG/1
4 TABLET ORAL ONCE
Refills: 0 | Status: COMPLETED | OUTPATIENT
Start: 2025-01-08 | End: 2025-01-08

## 2025-01-08 RX ORDER — OSELTAMIVIR 75 MG/1
1 CAPSULE ORAL
Qty: 10 | Refills: 0
Start: 2025-01-08 | End: 2025-01-12

## 2025-01-08 RX ORDER — AZITHROMYCIN MONOHYDRATE 200 MG/5ML
500 POWDER, FOR SUSPENSION ORAL ONCE
Refills: 0 | Status: COMPLETED | OUTPATIENT
Start: 2025-01-08 | End: 2025-01-08

## 2025-01-08 RX ORDER — AZITHROMYCIN MONOHYDRATE 200 MG/5ML
1 POWDER, FOR SUSPENSION ORAL
Qty: 4 | Refills: 0
Start: 2025-01-08 | End: 2025-01-11

## 2025-01-08 RX ADMIN — FAMOTIDINE 20 MILLIGRAM(S): 20 TABLET, FILM COATED ORAL at 09:30

## 2025-01-08 RX ADMIN — AZITHROMYCIN MONOHYDRATE 500 MILLIGRAM(S): 200 POWDER, FOR SUSPENSION ORAL at 13:00

## 2025-01-08 RX ADMIN — ONDANSETRON 4 MILLIGRAM(S): 4 TABLET ORAL at 09:30

## 2025-01-08 RX ADMIN — ACETAMINOPHEN 400 MILLIGRAM(S): 80 SOLUTION/ DROPS ORAL at 09:30

## 2025-01-08 RX ADMIN — CEFTRIAXONE SODIUM 1000 MILLIGRAM(S): 1 INJECTION, POWDER, FOR SOLUTION INTRAMUSCULAR; INTRAVENOUS at 13:01

## 2025-01-08 NOTE — ED PROVIDER NOTE - NSFOLLOWUPINSTRUCTIONS_ED_ALL_ED_FT
stay well-hydrated.  Take Tylenol and/or Motrin as needed for fever and bodyaches at home.   prescriptions and take as directed.  Follow-up with your primary care doctor within 2 to 3 days.  You have influenza A and pneumonia. Return to the Emergency Department for worsening or persistent symptoms, and/or ANY NEW OR CONCERNING SYMPTOMS. If you have issues obtaining follow up, please call: [phone] ([phone] or [phone]  to obtain a doctor or specialist who takes your insurance in your area.    manténgase joe hidratado.  [place] Tylenol y/o Motrin según sea necesario para la fiebre y los gilmer corporales en casa.  Recoja los medicamentos recetados y tómelos según las indicaciones.  Leelee un seguimiento con sanchez médico de atención primaria dentro de 2 a 3 días.  Tienes gripe A y neumonía. Regrese al Departamento de Emergencias si los síntomas empeoran o persisten, y/o CUALQUIER SÍNTOMA NUEVO O PREOCUPANTE. Si tiene problemas para obtener un seguimiento, llame al: [phone] ([phone] o al [phone] para obtener un médico o especialista que acepte sanchez seguro en sanchez área.        [place]    Gripe en los adultos  Influenza, Adult  A la gripe también se la conoce scottie influenza. Es nay infección que afecta las vías respiratorias. Estas incluyen la nariz, la garganta, la tráquea y los pulmones.    La gripe es contagiosa. [place] significa que se transmite fácilmente de nay persona a otra. Causa síntomas que son scottie un resfrío. También puede provocar fiebre elizabeth y gilmer corporales.    ¿Cuáles son las causas?  La gripe es causada por el virus de la influenza. Puede contraerlo de las siguientes maneras:  Al inhalar las gotitas que quedan en el aire después de que nay persona infectada tose o estornuda.  Al tocar algo que está contaminado con el virus y luego llevarse la mano a la boca, la nariz o los ojos.  ¿Qué incrementa el riesgo?  Puede ser más propenso a contraer gripe si:  No se lava las hang con frecuencia.  Está cerca de muchas personas petra la temporada de resfrío y gripe.  Se toca la boca, los ojos o la nariz sin antes lavarse las hang.  No recibe la vacuna antigripal todos los años.  También puede correr un mayor riesgo de tener gripe y problemas graves, scottie nay infección pulmonar llamada neumonía, si:  Tiene más de 65 años.  Está embarazada.  Sanchez sistema inmunitario está débil. Sanchez sistema inmunitario es el sistema de defensa de sanchez cuerpo.  Tiene nay afección a nathaly plazo, o crónica, scottie:  Enfermedad pulmonar, cardíaca o renal.  Diabetes.  Un trastorno del hígado.  Asma.  Tiene sobrepeso.  Tiene anemia. [place] ocurre cuando no tiene suficientes glóbulos rojos en el cuerpo.  ¿Cuáles son los signos o síntomas?  Los síntomas de la gripe suelen aparecer de repente. Pueden durar entre 4 y 14 días e incluir lo siguiente:  Fiebre y escalofríos.  Gilmer de anthony, gilmer en el cuerpo o gilmer musculares.  Dolor de garganta.  Tos.  Secreción o congestión nasal.  Molestias en el pecho.  No querer comer tanto scottie lo hace normalmente.  Sensación de debilidad o cansancio.  Sensación de mareo.  Náuseas o vómitos.  ¿Cómo se diagnostica?  La gripe puede diagnosticarse en función de los síntomas y los antecedentes médicos. También pueden hacerle un examen físico. Es posible que le heather un hisopado de la nariz o la garganta para detectar el virus.    ¿Cómo se trata?  Si la gripe se detecta de forma temprana, puede recibir tratamiento con medicamentos antivirales. Estos se pueden administrar por boca o a través de nay vía intravenosa (i.v.). Pueden ayudarlo a sentirse menos enfermo y a mejorar más rápido.    Cuidarse en sanchez hogar también puede ayudar a que mejoren kell síntomas. El médico puede indicarle que:  [place] medicamentos de venta noelle.  Jo-Ann mucho líquido.  La gripe suele desaparecer emerald. Si tiene síntomas muy graves o problemas provocados por la gripe, es posible que necesite recibir tratamiento en un hospital.    Siga estas instrucciones en sanchez casa:  Actividad    Descanse todo lo que sea necesario. Duerma mucho.  Quédese en sanchez casa y no concurra al trabajo o a la escuela, scottie se lo haya indicado sanchez médico.  Salga de sanchez casa solo para ir al médico.  No salga de sanchez casa por otros motivos hasta que no haya tenido fiebre por 24 horas sin kaley medicamentos.  Comida y bebida    [place] nay solución de rehidratación oral (oral rehydration solution, ORS). Es nay bebida que se vende en farmacias y tiendas.  Jo-Ann suficiente líquido scottie para mantener la orina de color amarillo pálido.  Trate de beber pequeñas cantidades de líquidos leonid. Estos incluyen agua, cubitos de hielo, jugo de fruta rebajado con agua y bebidas deportivas bajas en calorías.  Trate de comer alimentos suaves que desiree fáciles de digerir. Estos incluyen bananas, compota de manzana, arroz, zohaib magras, tostadas y galletas saladas.  Evite las bebidas con alto contenido de azúcar o cafeína. Estas incluyen las bebidas energéticas, las bebidas deportivas comunes y los refrescos.  No j-oann alcohol.  No coma alimentos grasos o muy condimentados.  Instrucciones generales    A person covering their mouth and nose with a cloth while sneezing or coughing.  Washing hands with soap and water.  Use los medicamentos solamente scottie se lo haya indicado el médico.  Use un humidificador de aire frío para que el aire de sanchez casa esté más húmedo. [place] puede facilitar la respiración. También debe limpiar el humidificador todos los días. Para ello:  Vacíe el agua.  Vierta agua limpia.  Al toser o estornudar, cúbrase la boca y la nariz.  Lávese las hang frecuentemente con agua y jabón y petra al menos 20 segundos. Es sumamente importante que lo leelee después de toser o estornudar. Si no dispone de agua y jabón, use un desinfectante para hang.  ¿Cómo se previene?  A person receiving an injection in the upper arm.  Colóquese la vacuna antigripal todos los años. Pregúntele al médico cuándo debe recibir la vacuna contra la gripe.  Manténgase alejado de las personas que están enfermas petra el otoño y el invierno. El otoño y el invierno son la temporada de los resfríos y la gripe.  Comuníquese con un médico si:  Tiene síntomas nuevos.  Tiene dolor en el pecho.  Tiene heces líquidas, también llamado diarrea.  Tiene fiebre.  La tos empeora.  Empieza a tener más mucosidad.  Tiene ganas de vomitar o vomita.  Solicite ayuda de inmediato si:  Le falta el aire o tiene problemas para respirar.  Observa que la piel o las uñas están azules.  Presenta un dolor intenso o rigidez en el katharina.  Tiene un dolor de anthony repentino o tiene dolor en la heather o el oído.  Vomita cada vez que come o breann.  Estos síntomas pueden indicar nay emergencia. Llame al 911 de inmediato.  No espere a charlie si los síntomas desaparecen.  No conduzca por kell propios medios hasta el hospital.  Esta información no tiene scottie fin reemplazar el consejo del médico. Asegúrese de hacerle al médico cualquier pregunta que tenga.        [place]    Neumonía extrahospitalaria en los adultos  Community-Acquired Pneumonia, Adult  La neumonía es nay infección en los pulmones. Produce irritación e hinchazón en las vías respiratorias de los pulmones. También puede acumularse mucosidad y líquido en el interior de las vías respiratorias. [place] puede causar tos y dificultad para respirar.    Un tipo de neumonía puede suceder mientras la persona está en un hospital. Un tipo diferente de la enfermedad puede suceder cuando la persona no está en un hospital (neumonía extrahospitalaria).    ¿Cuáles son las causas?  The human body, showing how a virus travels from the air to a person's lungs.   La causa de esta afección son los gérmenes (virus, bacterias u hongos). Algunos tipos de gérmenes pueden contagiarse de nay persona a otra. No se jules que la neumonía se transmita de nay persona a otra.    ¿Qué incrementa el riesgo?  Tiene nay enfermedad a nathaly plazo (crónica), scottie las siguientes:  Enfermedad pulmonar. Esta puede ser enfermedad pulmonar obstructiva crónica (EPOC) o asma.  Insuficiencia cardíaca.  Fibrosis quística.  Diabetes.  Enfermedad renal.  Anemia drepanocítica.  VIH.  Tiene otros problemas de gabby, tales scottie:  Sanchez sistema de defensa del cuerpo (sistema inmunitario) está debilitado.  Nay afección que puede hacer que respire líquidos de la boca y la nariz.  Le extirparon el bazo.  No se cuida joe los dientes y la boca (higiene dental deficiente).  Consume o ha consumido productos con tabaco.  Va a lugares donde los gérmenes que causan esta enfermedad son frecuentes.  Es mayor de 65 años.  ¿Cuáles son los signos o síntomas?  Tos.  Fiebre.  Sudoración o escalofríos.  Dolor torácico, a menudo al respirar profundamente o toser.  Problemas respiratorios, scottie:  Respiración acelerada.  Dificultad para respirar.  Falta de aire.  Sensación de cansancio (fatiga).  Gilmer musculares.  ¿Cómo se trata?  El tratamiento de esta afección depende de muchos factores, tales scottie:  La causa de la enfermedad.  Los medicamentos que terri.  Kell otros problemas de gabby.  La mayoría de los adultos pueden recibir tratamiento en el hogar. A veces, el tratamiento debe realizarse en un hospital.  El tratamiento puede incluir recibir medicamentos para matar los gérmenes.  Los medicamentos pueden depender del germen que causó la enfermedad.  La neumonía muy grave es poco frecuente. Si la contrae, es probable que ocurra lo siguiente:  Use nay máquina para ayudarlo a respirar.  Se le extraiga líquido de los pulmones.  Siga estas instrucciones en sanchez casa:  Medicamentos    Use los medicamentos de venta noelle y los recetados solamente scottie se lo haya indicado el médico.  [place] los medicamentos para la tos solamente si no puede dormir. Los medicamentos para la tos pueden evitar que el cuerpo elimine la mucosidad de los pulmones.  Si le recetaron antibióticos, tómelos scottie se lo haya indicado el médico. No deje de tomarlos aunque comience a sentirse mejor.  Estilo de joan    A sign showing that a person should not drink alcohol.  A sign showing that a person should not smoke.  No fume ni consuma ningún producto que contenga nicotina o tabaco. Si necesita ayuda para dejar de consumir estos productos, consulte al médico.  No jo-ann alcohol.  Siga nay dieta saludable. Esta debe incluir muchas verduras, frutas, cereales integrales, productos lácteos bajos en grasa y proteínas bajas en grasa (magras).  Instrucciones generales    A comparison of three sample cups showing dark yellow, yellow, and pale yellow urine.  Descanse mucho. Duerma scottie mínimo 8 horas todas las noches.  Duerma con la anthony y el katharina elevados. Coloque algunas almohadas debajo de la anthony o duerma en nay silla reclinable.  Retome kell actividades normales según lo indicado por el médico. Pregúntele al médico qué actividades son seguras para usted.  Beber suficiente líquido para mantener el pis (orina) de color amarillo pálido.  Si tiene dolor de garganta, leelee gárgaras con nay mezcla de agua y sal 3 o 4 veces al día, o cuando sea necesario. Para preparar agua con sal, disuelva totalmente de ½ a 1 cucharadita (de 3 a 6 g) de sal en 1 taza (237 ml) de agua tibia.  Concurra a todas las visitas de seguimiento.  ¿Cómo se previene?  Recibir la vacuna contra la neumonía. Estas vacunas tienen diferentes tipos y cronogramas. Pregúntele a sanchez médico cuál funciona mejor para usted. Piense en recibir esta vacuna si:  Es mayor de 65 años.  Tiene entre 19 y 65 años, y además:  Recibe tratamiento para el cáncer.  Tiene nay enfermedad pulmonar a nathaly plazo.  Tiene otros problemas que afectan el sistema de defensa del cuerpo. Consulte a sanchez médico si tiene alguno de estos.  Recibir la vacuna antigripal todos los años. Pregunte a sanchez médico qué vacuna es la mejor para usted.  Ir al dentista con la frecuencia que le hayan indicado.  Lavarse las hang frecuentemente con agua y jabón petra al menos 20 segundos. Use un desinfectante para hang si no dispone de agua y jabón.  Comuníquese con un médico si:  Tiene fiebre.  No puede dormir porque el medicamento para la tos no lo ayuda.  Solicite ayuda de inmediato si:  Tiene falta de aire que empeora.  Aumenta el dolor en el pecho.  La enfermedad empeora. [place] es muy grave si usted:  Es un adulto mayor.  Tiene debilitado el sistema de defensa del cuerpo.  Tose y escupe vannessa.  Estos síntomas pueden indicar nay emergencia. Solicite ayuda de inmediato. Llame al 911.  No espere a charlie si los síntomas desaparecen.  No conduzca por kell propios medios hasta el hospital.  Resumen  La neumonía es nay infección en los pulmones.  La neumonía extrahospitalaria afecta a personas que no rose estado en el hospital. Ciertos gérmenes pueden causar esta infección.  Esta afección se puede tratar con medicamentos para kevon los gérmenes.  En el truong de nay neumonía muy grave, es posible que necesite hospitalización y tratamiento para ayudar a la respiración.  Esta información no tiene scottie fin reemplazar el consejo del médico. Asegúrese de hacerle al médico cualquier pregunta que tenga.

## 2025-01-08 NOTE — ED PROVIDER NOTE - OBJECTIVE STATEMENT
39-year-old otherwise healthy female presents today with 1.5 weeks of cough, nausea, 1 episode of vomiting yesterday, body aches, subjective fevers and fatigue.  Patient reports that her son had influenza last week.  States today she coughed up sputum with a streak of blood so she came here for evaluation.  Has not taken any medications today.  Denies headache, vision changes, numbness tingling weakness, abdominal pain, urinary symptoms, black or bloody stools.  No history of PEs, DVT, travel outside of the country recently, exposure anybody with TB.  Patient does not have a primary care doctor 39-year-old otherwise healthy female presents today with 1.5 weeks of cough, nausea, 1 episode of vomiting yesterday, body aches, subjective fevers and fatigue.  Patient reports that her son had influenza last week.  States today she coughed up sputum with a streak of blood so she came here for evaluation.  Has not taken any medications today.  Denies headache, vision changes, numbness tingling weakness, abdominal pain, urinary symptoms, black or bloody stools.  No history of PEs, DVT, travel outside of the country recently, exposure anybody with TB.  Patient does not have a primary care doctor     #[id number]

## 2025-01-08 NOTE — ED PROVIDER NOTE - CLINICAL SUMMARY MEDICAL DECISION MAKING FREE TEXT BOX
presentation most concerning for upper respiratory viral illness, bronchitis, PNA.  Plan for EKG, chest x-ray, labs including D-dimer, IV hydration, pain control, monitor and reassessment.  EKG shows sinus tachycardia, rate 108, normal axis, normal intervals, no ST elevations or depressions. presentation most concerning for upper respiratory viral illness, bronchitis, PNA.  Plan for EKG, chest x-ray, labs including D-dimer, IV hydration, pain control, monitor and reassessment.  EKG shows sinus tachycardia, rate 108, normal axis, normal intervals, no ST elevations or depressions. Chest x-ray shows no acute infiltrate.  Labs show normal troponin, elevated D-dimer, with WBC 15.  CT angio chest negative for PE, concerning for bilateral pneumonia.  Patient has curb 65 score of 0, is 100% on room air.  Treated with ceftriaxone azithromycin and DC home in stable condition with Tamiflu Augmentin and azithromycin.  Advised follow-up with primary care doctor within 2 to 3 days, given strict return precautions DC home in stable condition

## 2025-01-08 NOTE — ED PROVIDER NOTE - PATIENT PORTAL LINK FT
You can access the FollowMyHealth Patient Portal offered by [facility] by registering at the following website: [email or web address]. By joining [facility]’s FollowMyHealth portal, you will also be able to view your health information using other applications (apps) compatible with our system.

## 2025-01-08 NOTE — ED PROVIDER NOTE - PHYSICAL EXAMINATION
constitutional: well appearing, NAD AAOx3  Eyes: EOMI, PERRL  Head: Normocephalic atraumatic  Mouth: no airway obstruction, posterior oropharynx clear without erythema or exudate  Cardiac: regular rate and rhythm, no MRG, mild chest wall tenderness without skin changes or crepitus  Resp: Lungs CTAB  GI: Abd s/nt/nd  Neuro: CN2-12 intact, strength 5/5x4, sensation grossly intact  Skin: No rashes

## 2025-01-08 NOTE — ED PROVIDER NOTE - NSFOLLOWUPCLINICS_GEN_ALL_ED_FT
[facility] [address] [id number] [place] [address]  Phone: [phone]  Fax:   Follow Up Time: 1-3 Days

## 2025-01-08 NOTE — ED ADULT NURSE REASSESSMENT NOTE - NS ED NURSE REASSESS COMMENT FT1
Received patient from Andrea COURTNEY RN. Pt resting comfortably in bed with no complaints at this time. Family at bedside. Awaiting disposition. Comfort and safety maintained.

## 2025-01-08 NOTE — ED ADULT NURSE NOTE - OBJECTIVE STATEMENT
Pt. with PMH, presents to ED c/o nausea, vomiting and flu like ss x 2weeks. +sick contact, son had Flu, did not seen PCP.   Assessment:   General: Well appearing, VSS,  Nuro: AOX4, Mood and behavior appropriate to situation.    Skin: appears slightly pale, Normal turgor, Intact.     Head & Neck: WDL  CARD:  Denies CP, peripheral pulses equal and palpable, cap refill WDL. No edema noted.   Respiratory: No respiratory distress, unlabored, normal rate and rhythm. Denies SOB, VALVERDE,   GI: Abdomen soft and nondistended. No rebound tenderness or guarding.   : No reports of dysuria, frequency or urgency.   Musculoskeletal: Ambulates without assistance, ROM intact.   Safety: Bed in low position with side rails up. Undressed and in a hospital gown and Non-slip socks.

## 2025-01-08 NOTE — ED ADULT TRIAGE NOTE - CHIEF COMPLAINT QUOTE
Pt ambulatory to the ED with c/o cough, fevers, chest tightness and back pain x2 weeks. Pt endorses her son at home recently had the flu, mask provided to the patient. Pt is A&OX3, GCS 15. Pt also endorses blood when she's coughing and back pain. EKG in progress. Denies cardiac hx. NKDA.

## 2025-04-17 ENCOUNTER — OUTPATIENT (OUTPATIENT)
Dept: OUTPATIENT SERVICES | Facility: HOSPITAL | Age: 40
LOS: 1 days | End: 2025-04-17
Payer: COMMERCIAL

## 2025-04-17 ENCOUNTER — APPOINTMENT (OUTPATIENT)
Dept: RADIOLOGY | Facility: CLINIC | Age: 40
End: 2025-04-17
Payer: COMMERCIAL

## 2025-04-17 DIAGNOSIS — Z87.01 PERSONAL HISTORY OF PNEUMONIA (RECURRENT): ICD-10-CM

## 2025-04-17 PROCEDURE — 71046 X-RAY EXAM CHEST 2 VIEWS: CPT | Mod: 26

## 2025-04-17 PROCEDURE — 71046 X-RAY EXAM CHEST 2 VIEWS: CPT

## 2025-04-29 PROBLEM — Z00.00 ENCOUNTER FOR PREVENTIVE HEALTH EXAMINATION: Status: ACTIVE | Noted: 2025-04-29

## 2025-06-06 ENCOUNTER — APPOINTMENT (OUTPATIENT)
Dept: MAMMOGRAPHY | Facility: CLINIC | Age: 40
End: 2025-06-06

## 2025-06-06 ENCOUNTER — OUTPATIENT (OUTPATIENT)
Dept: OUTPATIENT SERVICES | Facility: HOSPITAL | Age: 40
LOS: 1 days | End: 2025-06-06
Payer: COMMERCIAL

## 2025-06-06 DIAGNOSIS — Z12.39 ENCOUNTER FOR OTHER SCREENING FOR MALIGNANT NEOPLASM OF BREAST: ICD-10-CM

## 2025-06-06 DIAGNOSIS — Z00.8 ENCOUNTER FOR OTHER GENERAL EXAMINATION: ICD-10-CM

## 2025-06-06 PROCEDURE — 76642 ULTRASOUND BREAST LIMITED: CPT

## 2025-06-06 PROCEDURE — 77066 DX MAMMO INCL CAD BI: CPT

## 2025-06-06 PROCEDURE — G0279: CPT | Mod: 26

## 2025-06-06 PROCEDURE — G0279: CPT

## 2025-06-06 PROCEDURE — 76642 ULTRASOUND BREAST LIMITED: CPT | Mod: 26,LT

## 2025-06-06 PROCEDURE — 77066 DX MAMMO INCL CAD BI: CPT | Mod: 26

## 2025-06-10 ENCOUNTER — APPOINTMENT (OUTPATIENT)
Dept: SURGERY | Facility: HOSPITAL | Age: 40
End: 2025-06-10

## 2025-06-13 ENCOUNTER — APPOINTMENT (OUTPATIENT)
Dept: ULTRASOUND IMAGING | Facility: CLINIC | Age: 40
End: 2025-06-13
Payer: COMMERCIAL

## 2025-06-13 ENCOUNTER — OUTPATIENT (OUTPATIENT)
Dept: OUTPATIENT SERVICES | Facility: HOSPITAL | Age: 40
LOS: 1 days | End: 2025-06-13
Payer: COMMERCIAL

## 2025-06-13 DIAGNOSIS — Z00.8 ENCOUNTER FOR OTHER GENERAL EXAMINATION: ICD-10-CM

## 2025-06-13 PROCEDURE — 19083 BX BREAST 1ST LESION US IMAG: CPT

## 2025-06-13 PROCEDURE — 19083 BX BREAST 1ST LESION US IMAG: CPT | Mod: LT,53
